# Patient Record
Sex: FEMALE | Race: WHITE | NOT HISPANIC OR LATINO | Employment: FULL TIME | URBAN - METROPOLITAN AREA
[De-identification: names, ages, dates, MRNs, and addresses within clinical notes are randomized per-mention and may not be internally consistent; named-entity substitution may affect disease eponyms.]

---

## 2023-02-21 LAB — THYROTROPIN (MIU/L) IN SER/PLAS BY DETECTION LIMIT <= 0.05 MIU/L: 1.71 MIU/L (ref 0.44–3.98)

## 2023-02-22 LAB
CLUE CELLS: ABNORMAL
NUGENT SCORE: 4
VAGINITIS-BV + YEAST INTERPRETATION: ABNORMAL
YEAST: ABNORMAL

## 2023-02-23 PROBLEM — N92.6 IRREGULAR BLEEDING: Status: ACTIVE | Noted: 2023-02-23

## 2023-02-23 PROBLEM — B37.31 VAGINAL CANDIDIASIS: Status: ACTIVE | Noted: 2023-02-23

## 2023-02-23 PROBLEM — F41.8 DEPRESSION WITH ANXIETY: Status: ACTIVE | Noted: 2023-02-23

## 2023-02-23 PROBLEM — N89.8 VAGINAL DISCHARGE: Status: ACTIVE | Noted: 2023-02-23

## 2023-02-23 PROBLEM — J30.2 ALLERGIC RHINITIS, SEASONAL: Status: ACTIVE | Noted: 2023-02-23

## 2023-02-23 PROBLEM — N92.1 MENORRHAGIA WITH IRREGULAR CYCLE: Status: ACTIVE | Noted: 2023-02-23

## 2023-02-23 PROBLEM — E03.9 HYPOTHYROIDISM: Status: ACTIVE | Noted: 2023-02-23

## 2023-02-23 PROBLEM — Z97.5 IUD CONTRACEPTION: Status: ACTIVE | Noted: 2023-02-23

## 2023-02-23 PROBLEM — F41.1 ANXIETY STATE: Status: ACTIVE | Noted: 2023-02-23

## 2023-02-23 PROBLEM — E66.9 CLASS 2 OBESITY WITH BODY MASS INDEX (BMI) OF 35.0 TO 35.9 IN ADULT: Status: ACTIVE | Noted: 2023-02-23

## 2023-02-23 PROBLEM — E55.9 VITAMIN D DEFICIENCY: Status: ACTIVE | Noted: 2023-02-23

## 2023-02-23 PROBLEM — E66.812 CLASS 2 OBESITY WITH BODY MASS INDEX (BMI) OF 35.0 TO 35.9 IN ADULT: Status: ACTIVE | Noted: 2023-02-23

## 2023-02-23 PROBLEM — R45.89 DEPRESSED MOOD: Status: ACTIVE | Noted: 2023-02-23

## 2023-02-23 RX ORDER — SERTRALINE HYDROCHLORIDE 50 MG/1
50 TABLET, FILM COATED ORAL DAILY
COMMUNITY
End: 2023-03-22 | Stop reason: SDUPTHER

## 2023-02-23 RX ORDER — LEVONORGESTREL 52 MG/1
INTRAUTERINE DEVICE INTRAUTERINE
COMMUNITY

## 2023-02-23 RX ORDER — METRONIDAZOLE 500 MG/1
500 TABLET ORAL 2 TIMES DAILY
COMMUNITY
End: 2023-03-22 | Stop reason: ALTCHOICE

## 2023-02-23 RX ORDER — FLUTICASONE PROPIONATE 50 MCG
2 SPRAY, SUSPENSION (ML) NASAL DAILY
COMMUNITY
Start: 2019-10-17 | End: 2023-03-22 | Stop reason: SDUPTHER

## 2023-03-22 ENCOUNTER — OFFICE VISIT (OUTPATIENT)
Dept: PRIMARY CARE | Facility: CLINIC | Age: 38
End: 2023-03-22
Payer: COMMERCIAL

## 2023-03-22 VITALS
WEIGHT: 207.4 LBS | SYSTOLIC BLOOD PRESSURE: 124 MMHG | HEIGHT: 62 IN | HEART RATE: 93 BPM | BODY MASS INDEX: 38.16 KG/M2 | DIASTOLIC BLOOD PRESSURE: 84 MMHG | RESPIRATION RATE: 18 BRPM | OXYGEN SATURATION: 96 %

## 2023-03-22 DIAGNOSIS — K21.9 GASTROESOPHAGEAL REFLUX DISEASE WITHOUT ESOPHAGITIS: ICD-10-CM

## 2023-03-22 DIAGNOSIS — Z13.29 SCREENING FOR HYPOTHYROIDISM: ICD-10-CM

## 2023-03-22 DIAGNOSIS — F17.210 CIGARETTE SMOKER: ICD-10-CM

## 2023-03-22 DIAGNOSIS — E55.9 VITAMIN D DEFICIENCY: ICD-10-CM

## 2023-03-22 DIAGNOSIS — E78.2 MIXED HYPERLIPIDEMIA: ICD-10-CM

## 2023-03-22 DIAGNOSIS — R73.01 ELEVATED FASTING BLOOD SUGAR: ICD-10-CM

## 2023-03-22 DIAGNOSIS — B96.89 BV (BACTERIAL VAGINOSIS): ICD-10-CM

## 2023-03-22 DIAGNOSIS — N76.0 BV (BACTERIAL VAGINOSIS): ICD-10-CM

## 2023-03-22 DIAGNOSIS — F41.8 DEPRESSION WITH ANXIETY: Primary | ICD-10-CM

## 2023-03-22 DIAGNOSIS — J30.1 SEASONAL ALLERGIC RHINITIS DUE TO POLLEN: ICD-10-CM

## 2023-03-22 DIAGNOSIS — E53.8 VITAMIN B12 DEFICIENCY: ICD-10-CM

## 2023-03-22 DIAGNOSIS — Z09 FOLLOW-UP EXAM: ICD-10-CM

## 2023-03-22 PROBLEM — E66.9 CLASS 2 OBESITY WITH BODY MASS INDEX (BMI) OF 35.0 TO 35.9 IN ADULT: Status: RESOLVED | Noted: 2023-02-23 | Resolved: 2023-03-22

## 2023-03-22 PROBLEM — E66.812 CLASS 2 OBESITY WITH BODY MASS INDEX (BMI) OF 35.0 TO 35.9 IN ADULT: Status: RESOLVED | Noted: 2023-02-23 | Resolved: 2023-03-22

## 2023-03-22 PROBLEM — N92.1 MENORRHAGIA WITH IRREGULAR CYCLE: Status: RESOLVED | Noted: 2023-02-23 | Resolved: 2023-03-22

## 2023-03-22 PROBLEM — E03.9 HYPOTHYROIDISM: Status: RESOLVED | Noted: 2023-02-23 | Resolved: 2023-03-22

## 2023-03-22 PROBLEM — B37.31 VAGINAL CANDIDIASIS: Status: RESOLVED | Noted: 2023-02-23 | Resolved: 2023-03-22

## 2023-03-22 PROBLEM — F41.1 ANXIETY STATE: Status: RESOLVED | Noted: 2023-02-23 | Resolved: 2023-03-22

## 2023-03-22 PROBLEM — R45.89 DEPRESSED MOOD: Status: RESOLVED | Noted: 2023-02-23 | Resolved: 2023-03-22

## 2023-03-22 PROCEDURE — 3008F BODY MASS INDEX DOCD: CPT

## 2023-03-22 PROCEDURE — 99204 OFFICE O/P NEW MOD 45 MIN: CPT

## 2023-03-22 PROCEDURE — 99406 BEHAV CHNG SMOKING 3-10 MIN: CPT

## 2023-03-22 PROCEDURE — 4004F PT TOBACCO SCREEN RCVD TLK: CPT

## 2023-03-22 RX ORDER — FLUTICASONE PROPIONATE 50 MCG
2 SPRAY, SUSPENSION (ML) NASAL DAILY
Qty: 16 G | Refills: 11 | Status: SHIPPED | OUTPATIENT
Start: 2023-03-22

## 2023-03-22 RX ORDER — PRASTERONE (DHEA) 5 MG
CAPSULE ORAL
Qty: 30 CAPSULE | Refills: 11 | Status: SHIPPED | OUTPATIENT
Start: 2023-03-22 | End: 2023-04-25 | Stop reason: SDUPTHER

## 2023-03-22 RX ORDER — OMEPRAZOLE 20 MG/1
40 TABLET, DELAYED RELEASE ORAL
Qty: 60 TABLET | Refills: 11 | COMMUNITY
Start: 2023-03-22 | End: 2023-04-25 | Stop reason: ALTCHOICE

## 2023-03-22 RX ORDER — SERTRALINE HYDROCHLORIDE 50 MG/1
50 TABLET, FILM COATED ORAL DAILY
Qty: 90 TABLET | Refills: 3 | Status: SHIPPED | OUTPATIENT
Start: 2023-03-22 | End: 2023-04-25

## 2023-03-22 ASSESSMENT — ENCOUNTER SYMPTOMS
DEPRESSION: 0
NEUROLOGICAL NEGATIVE: 1
HEMATOLOGIC/LYMPHATIC NEGATIVE: 1
ALLERGIC/IMMUNOLOGIC NEGATIVE: 1
CONSTITUTIONAL NEGATIVE: 1
MUSCULOSKELETAL NEGATIVE: 1
GASTROINTESTINAL NEGATIVE: 1
RESPIRATORY NEGATIVE: 1
EYES NEGATIVE: 1
PSYCHIATRIC NEGATIVE: 1
LOSS OF SENSATION IN FEET: 0
ENDOCRINE NEGATIVE: 1
CARDIOVASCULAR NEGATIVE: 1
OCCASIONAL FEELINGS OF UNSTEADINESS: 0

## 2023-03-22 ASSESSMENT — PATIENT HEALTH QUESTIONNAIRE - PHQ9
2. FEELING DOWN, DEPRESSED OR HOPELESS: NOT AT ALL
SUM OF ALL RESPONSES TO PHQ9 QUESTIONS 1 AND 2: 0
1. LITTLE INTEREST OR PLEASURE IN DOING THINGS: NOT AT ALL

## 2023-03-22 NOTE — ASSESSMENT & PLAN NOTE
I recommend a low carb diet and moderate to high intensity exercise for at least a total of 150 minutes per week for obesity with BMI of 37.93. Normal adult BMI that you should be targeting  is 20 to 25. Fifteen minutes was spent counselling.  We will discussed weight loss interventions at your 1 month appointment.  Labs must be completed.

## 2023-03-22 NOTE — ASSESSMENT & PLAN NOTE
Patient reports having worsening acid reflux and symptoms of GERD ever since starting sertraline 50 mg oral tablet daily.  Please begin taking omeprazole 20 mg oral tablet twice a day as needed for symptom management.

## 2023-03-22 NOTE — PROGRESS NOTES
"Subjective   Patient ID: Vita Conrad is a 38 y.o. female who presents for New Patient Visit.    HPI a 38-year-old female arrives to the clinic with chief complaint of new patient establishment.  Patient is here to establish with a new provider as her previous one is no longer in network.  Patient does have a past medical history of recurrent bacterial vaginosis and anxiety depression.  Her OB/GYN started her on sertraline 50 mg oral tablet daily for anxiety and depression.  Patient reports success with this medication and control of her emotions.  She is wonder if she can continue this medication.  She also has a few questions regards to her sertraline that she would like to discuss.  Other than this, patient denies any chest pain, shortness of breath, diarrhea, fevers, chills, head pain, COVID-like symptoms.    Review of Systems   Constitutional: Negative.    HENT: Negative.     Eyes: Negative.    Respiratory: Negative.     Cardiovascular: Negative.    Gastrointestinal: Negative.    Endocrine: Negative.    Genitourinary: Negative.    Musculoskeletal: Negative.    Skin: Negative.    Allergic/Immunologic: Negative.    Neurological: Negative.    Hematological: Negative.    Psychiatric/Behavioral: Negative.     All other systems reviewed and are negative.      Objective   /84 (BP Location: Left arm, Patient Position: Sitting, BP Cuff Size: Large adult)   Pulse 93   Resp 18   Ht 1.575 m (5' 2\")   Wt 94.1 kg (207 lb 6.4 oz)   SpO2 96%   BMI 37.93 kg/m²     Physical Exam  Vitals and nursing note reviewed.   Constitutional:       Appearance: Normal appearance.   HENT:      Head: Normocephalic and atraumatic.      Right Ear: Tympanic membrane normal.      Left Ear: Tympanic membrane normal.      Nose: Nose normal.      Mouth/Throat:      Mouth: Mucous membranes are moist.      Pharynx: Oropharynx is clear.   Eyes:      Extraocular Movements: Extraocular movements intact.      Conjunctiva/sclera: Conjunctivae " normal.      Pupils: Pupils are equal, round, and reactive to light.   Cardiovascular:      Rate and Rhythm: Normal rate and regular rhythm.   Pulmonary:      Effort: Pulmonary effort is normal.      Breath sounds: Normal breath sounds.   Abdominal:      General: Bowel sounds are normal.      Palpations: Abdomen is soft.   Musculoskeletal:         General: Normal range of motion.      Cervical back: Normal range of motion and neck supple.   Skin:     General: Skin is warm.      Capillary Refill: Capillary refill takes less than 2 seconds.   Neurological:      General: No focal deficit present.      Mental Status: She is alert and oriented to person, place, and time. Mental status is at baseline.   Psychiatric:         Mood and Affect: Mood normal.         Behavior: Behavior normal.         Thought Content: Thought content normal.         Judgment: Judgment normal.         Assessment/Plan   Problem List Items Addressed This Visit          Digestive    Gastroesophageal reflux disease without esophagitis     Patient reports having worsening acid reflux and symptoms of GERD ever since starting sertraline 50 mg oral tablet daily.  Please begin taking omeprazole 20 mg oral tablet twice a day as needed for symptom management.         Relevant Medications    omeprazole OTC (PriLOSEC OTC) 20 mg EC tablet       Genitourinary    BV (bacterial vaginosis)     I have prescribed you maximum pearls over-the-counter 5 mg to take once a day for BV prevention         Relevant Medications    L.acidoph,plant-B.animal,long (Probiotic Pearls Max Potency) 5 billion cell capsule,delayed release(DR/EC)       Endocrine/Metabolic    Vitamin D deficiency     Vitamin D levels ordered.         Relevant Orders    Vitamin D, Total    Vitamin B12 deficiency     Vitamin B12 levels ordered.         Relevant Orders    CBC    Vitamin B12    BMI 37.0-37.9, adult     I recommend a low carb diet and moderate to high intensity exercise for at least a total  of 150 minutes per week for obesity with BMI of 37.93. Normal adult BMI that you should be targeting  is 20 to 25. Fifteen minutes was spent counselling.  We will discussed weight loss interventions at your 1 month appointment.  Labs must be completed.            Other    Allergic rhinitis, seasonal     Flonase intranasal ordered.         Relevant Medications    fluticasone (Flonase) 50 mcg/actuation nasal spray    Depression with anxiety - Primary     Patient was started on sertraline 50 mg oral tablet daily by her OB/GYN 2 weeks ago.  Patient reports success with this medication would like to continue it.  Patient denies any suicidal homicidal ideations or worsening anxiety and depression.  Continue sertraline 50 mg oral tablet daily.         Relevant Medications    sertraline (Zoloft) 50 mg tablet    Elevated fasting blood sugar     Hemoglobin A1c ordered.         Relevant Orders    Hemoglobin A1C    Mixed hyperlipidemia     Lipid panel ordered.         Relevant Orders    Comprehensive Metabolic Panel    Lipid Panel    Screening for hypothyroidism     TSH levels ordered.         Relevant Orders    TSH with reflex to Free T4 if abnormal    Cigarette smoker     Cigarette smoking cessation counselling for 4 minutes. Patient will focus on stopping  to help decrease risk of developing cancers, respiratory, and cardiovascular diseases.           Other Visit Diagnoses       Follow-up exam        Relevant Orders    Follow Up In [facility] - PCP          It was a pleasure seeing in the office today.    Please complete the lab work that was ordered prior to your next appointment.  These labs require you to fast.  Your next appointment will be in 1 month.    Anticipatory guidance, age appropriate vaccines, screening exams, health promotion and prevention discussed.    This document was generated using the assistance of voice recognition software. If there are any errors of spelling, grammar, syntax, or meaning;  please feel free to contact me directly for clarification.

## 2023-03-22 NOTE — ASSESSMENT & PLAN NOTE
Cigarette smoking cessation counselling for 4 minutes. Patient will focus on stopping  to help decrease risk of developing cancers, respiratory, and cardiovascular diseases.    no photophobia/no diplopia/no blurred vision L/no blurred vision R

## 2023-03-22 NOTE — ASSESSMENT & PLAN NOTE
Patient was started on sertraline 50 mg oral tablet daily by her OB/GYN 2 weeks ago.  Patient reports success with this medication would like to continue it.  Patient denies any suicidal homicidal ideations or worsening anxiety and depression.  Continue sertraline 50 mg oral tablet daily.

## 2023-03-22 NOTE — PROGRESS NOTES
New patient to establish.   Prescribed zoloft by obgyn for anxiety and depression- she would like you to take over and continue this med.

## 2023-04-18 ENCOUNTER — LAB (OUTPATIENT)
Dept: LAB | Facility: LAB | Age: 38
End: 2023-04-18
Payer: COMMERCIAL

## 2023-04-18 DIAGNOSIS — R73.01 ELEVATED FASTING BLOOD SUGAR: ICD-10-CM

## 2023-04-18 DIAGNOSIS — E55.9 VITAMIN D DEFICIENCY: ICD-10-CM

## 2023-04-18 DIAGNOSIS — E53.8 VITAMIN B12 DEFICIENCY: ICD-10-CM

## 2023-04-18 DIAGNOSIS — E78.2 MIXED HYPERLIPIDEMIA: ICD-10-CM

## 2023-04-18 DIAGNOSIS — Z13.29 SCREENING FOR HYPOTHYROIDISM: ICD-10-CM

## 2023-04-18 LAB
ALANINE AMINOTRANSFERASE (SGPT) (U/L) IN SER/PLAS: 32 U/L (ref 7–45)
ALBUMIN (G/DL) IN SER/PLAS: 4.2 G/DL (ref 3.4–5)
ALKALINE PHOSPHATASE (U/L) IN SER/PLAS: 71 U/L (ref 33–110)
ANION GAP IN SER/PLAS: 12 MMOL/L (ref 10–20)
ASPARTATE AMINOTRANSFERASE (SGOT) (U/L) IN SER/PLAS: 22 U/L (ref 9–39)
BILIRUBIN TOTAL (MG/DL) IN SER/PLAS: 0.4 MG/DL (ref 0–1.2)
CALCIDIOL (25 OH VITAMIN D3) (NG/ML) IN SER/PLAS: 28 NG/ML
CALCIUM (MG/DL) IN SER/PLAS: 9.2 MG/DL (ref 8.6–10.3)
CARBON DIOXIDE, TOTAL (MMOL/L) IN SER/PLAS: 25 MMOL/L (ref 21–32)
CHLORIDE (MMOL/L) IN SER/PLAS: 107 MMOL/L (ref 98–107)
CHOLESTEROL (MG/DL) IN SER/PLAS: 161 MG/DL (ref 0–199)
CHOLESTEROL IN HDL (MG/DL) IN SER/PLAS: 48.6 MG/DL
CHOLESTEROL/HDL RATIO: 3.3
COBALAMIN (VITAMIN B12) (PG/ML) IN SER/PLAS: 975 PG/ML (ref 211–911)
CREATININE (MG/DL) IN SER/PLAS: 0.91 MG/DL (ref 0.5–1.05)
ERYTHROCYTE DISTRIBUTION WIDTH (RATIO) BY AUTOMATED COUNT: 13.1 % (ref 11.5–14.5)
ERYTHROCYTE MEAN CORPUSCULAR HEMOGLOBIN CONCENTRATION (G/DL) BY AUTOMATED: 32.8 G/DL (ref 32–36)
ERYTHROCYTE MEAN CORPUSCULAR VOLUME (FL) BY AUTOMATED COUNT: 92 FL (ref 80–100)
ERYTHROCYTES (10*6/UL) IN BLOOD BY AUTOMATED COUNT: 4.28 X10E12/L (ref 4–5.2)
ESTIMATED AVERAGE GLUCOSE FOR HBA1C: 117 MG/DL
GFR FEMALE: 83 ML/MIN/1.73M2
GLUCOSE (MG/DL) IN SER/PLAS: 80 MG/DL (ref 74–99)
HEMATOCRIT (%) IN BLOOD BY AUTOMATED COUNT: 39.3 % (ref 36–46)
HEMOGLOBIN (G/DL) IN BLOOD: 12.9 G/DL (ref 12–16)
HEMOGLOBIN A1C/HEMOGLOBIN TOTAL IN BLOOD: 5.7 %
LDL: 82 MG/DL (ref 0–99)
LEUKOCYTES (10*3/UL) IN BLOOD BY AUTOMATED COUNT: 7.8 X10E9/L (ref 4.4–11.3)
PLATELETS (10*3/UL) IN BLOOD AUTOMATED COUNT: 296 X10E9/L (ref 150–450)
POTASSIUM (MMOL/L) IN SER/PLAS: 4.7 MMOL/L (ref 3.5–5.3)
PROTEIN TOTAL: 6.8 G/DL (ref 6.4–8.2)
SODIUM (MMOL/L) IN SER/PLAS: 139 MMOL/L (ref 136–145)
THYROTROPIN (MIU/L) IN SER/PLAS BY DETECTION LIMIT <= 0.05 MIU/L: 1.36 MIU/L (ref 0.44–3.98)
TRIGLYCERIDE (MG/DL) IN SER/PLAS: 154 MG/DL (ref 0–149)
UREA NITROGEN (MG/DL) IN SER/PLAS: 16 MG/DL (ref 6–23)
VLDL: 31 MG/DL (ref 0–40)

## 2023-04-18 PROCEDURE — 82306 VITAMIN D 25 HYDROXY: CPT

## 2023-04-18 PROCEDURE — 80053 COMPREHEN METABOLIC PANEL: CPT

## 2023-04-18 PROCEDURE — 36415 COLL VENOUS BLD VENIPUNCTURE: CPT

## 2023-04-18 PROCEDURE — 84443 ASSAY THYROID STIM HORMONE: CPT

## 2023-04-18 PROCEDURE — 85027 COMPLETE CBC AUTOMATED: CPT

## 2023-04-18 PROCEDURE — 80061 LIPID PANEL: CPT

## 2023-04-18 PROCEDURE — 82607 VITAMIN B-12: CPT

## 2023-04-18 PROCEDURE — 83036 HEMOGLOBIN GLYCOSYLATED A1C: CPT

## 2023-04-25 ENCOUNTER — OFFICE VISIT (OUTPATIENT)
Dept: PRIMARY CARE | Facility: CLINIC | Age: 38
End: 2023-04-25
Payer: COMMERCIAL

## 2023-04-25 VITALS
HEART RATE: 90 BPM | OXYGEN SATURATION: 95 % | SYSTOLIC BLOOD PRESSURE: 128 MMHG | HEIGHT: 62 IN | RESPIRATION RATE: 18 BRPM | DIASTOLIC BLOOD PRESSURE: 78 MMHG | WEIGHT: 199.4 LBS | BODY MASS INDEX: 36.7 KG/M2

## 2023-04-25 DIAGNOSIS — Z97.5 IUD CONTRACEPTION: ICD-10-CM

## 2023-04-25 DIAGNOSIS — F41.8 DEPRESSION WITH ANXIETY: ICD-10-CM

## 2023-04-25 DIAGNOSIS — Z09 FOLLOW-UP EXAM: Primary | ICD-10-CM

## 2023-04-25 DIAGNOSIS — E78.2 MIXED HYPERLIPIDEMIA: ICD-10-CM

## 2023-04-25 DIAGNOSIS — N76.0 BV (BACTERIAL VAGINOSIS): ICD-10-CM

## 2023-04-25 DIAGNOSIS — H66.90 ACUTE OTITIS MEDIA, UNSPECIFIED OTITIS MEDIA TYPE: ICD-10-CM

## 2023-04-25 DIAGNOSIS — R73.03 PREDIABETES: ICD-10-CM

## 2023-04-25 DIAGNOSIS — B96.89 BV (BACTERIAL VAGINOSIS): ICD-10-CM

## 2023-04-25 PROBLEM — K21.9 GASTROESOPHAGEAL REFLUX DISEASE WITHOUT ESOPHAGITIS: Status: RESOLVED | Noted: 2023-03-22 | Resolved: 2023-04-25

## 2023-04-25 PROBLEM — N92.6 IRREGULAR BLEEDING: Status: RESOLVED | Noted: 2023-02-23 | Resolved: 2023-04-25

## 2023-04-25 PROBLEM — N89.8 VAGINAL DISCHARGE: Status: RESOLVED | Noted: 2023-02-23 | Resolved: 2023-04-25

## 2023-04-25 PROCEDURE — 3008F BODY MASS INDEX DOCD: CPT

## 2023-04-25 PROCEDURE — 4004F PT TOBACCO SCREEN RCVD TLK: CPT

## 2023-04-25 PROCEDURE — 99213 OFFICE O/P EST LOW 20 MIN: CPT

## 2023-04-25 RX ORDER — METFORMIN HYDROCHLORIDE 500 MG/1
500 TABLET ORAL
Qty: 180 TABLET | Refills: 0 | Status: SHIPPED | OUTPATIENT
Start: 2023-04-25 | End: 2023-07-24

## 2023-04-25 RX ORDER — AMOXICILLIN AND CLAVULANATE POTASSIUM 875; 125 MG/1; MG/1
875 TABLET, FILM COATED ORAL 2 TIMES DAILY
Qty: 20 TABLET | Refills: 0 | Status: SHIPPED | OUTPATIENT
Start: 2023-04-25 | End: 2023-05-05

## 2023-04-25 RX ORDER — SERTRALINE HYDROCHLORIDE 25 MG/1
25 TABLET, FILM COATED ORAL DAILY
Qty: 90 TABLET | Refills: 0 | Status: SHIPPED | OUTPATIENT
Start: 2023-04-25 | End: 2023-08-04 | Stop reason: SDUPTHER

## 2023-04-25 RX ORDER — PRASTERONE (DHEA) 5 MG
CAPSULE ORAL
Qty: 30 CAPSULE | Refills: 11 | Status: SHIPPED | OUTPATIENT
Start: 2023-04-25

## 2023-04-25 RX ORDER — SERTRALINE HYDROCHLORIDE 50 MG/1
50 TABLET, FILM COATED ORAL DAILY
Qty: 90 TABLET | Refills: 0 | Status: SHIPPED | OUTPATIENT
Start: 2023-04-25 | End: 2023-08-04 | Stop reason: SDUPTHER

## 2023-04-25 ASSESSMENT — ENCOUNTER SYMPTOMS
GASTROINTESTINAL NEGATIVE: 1
CONSTITUTIONAL NEGATIVE: 1
RESPIRATORY NEGATIVE: 1
MUSCULOSKELETAL NEGATIVE: 1
EYES NEGATIVE: 1
CARDIOVASCULAR NEGATIVE: 1
ENDOCRINE NEGATIVE: 1
ALLERGIC/IMMUNOLOGIC NEGATIVE: 1
HEMATOLOGIC/LYMPHATIC NEGATIVE: 1
NEUROLOGICAL NEGATIVE: 1
PSYCHIATRIC NEGATIVE: 1

## 2023-04-25 NOTE — PROGRESS NOTES
"Subjective   Patient ID: Vita Conrad is a 38 y.o. female who presents for Follow-up.    HPI a 38-year-old female with past medical history of recurrent bacterial vaginosis, currently on birth control management via Mirena inserted by her OB/GYN, prediabetes, anxiety and depression, allergic rhinitis arrives to the clinic with chief complaint of 1 month follow-up.  Patient was given instructions to complete blood work.  She did complete the blood work that was ordered prior to this encounter like to review them today.  She also needs medication refills.  She does report having a decreased libido due to starting the Zoloft and is wondering if there are any medications to help with her libido.  She also is wondering if she can start any weight loss medications since we discussed that at her last appointment.  Other than this, patient denies any chest pain, shortness of breath, diarrhea, fevers, chills, head pain, COVID-like symptoms.    Review of Systems   Constitutional: Negative.    HENT: Negative.     Eyes: Negative.    Respiratory: Negative.     Cardiovascular: Negative.    Gastrointestinal: Negative.    Endocrine: Negative.    Genitourinary: Negative.    Musculoskeletal: Negative.    Skin: Negative.    Allergic/Immunologic: Negative.    Neurological: Negative.    Hematological: Negative.    Psychiatric/Behavioral: Negative.     All other systems reviewed and are negative.      Objective   /78 (BP Location: Left arm, BP Cuff Size: Large adult)   Pulse 90   Resp 18   Ht 1.575 m (5' 2\")   Wt 90.4 kg (199 lb 6.4 oz)   SpO2 95%   BMI 36.47 kg/m²     Physical Exam  Vitals and nursing note reviewed.   Constitutional:       Appearance: Normal appearance.   HENT:      Head: Normocephalic and atraumatic.      Right Ear: Tympanic membrane normal.      Left Ear: Tympanic membrane normal.      Nose: Nose normal.      Mouth/Throat:      Mouth: Mucous membranes are moist.      Pharynx: Oropharynx is clear.   Eyes: "      Extraocular Movements: Extraocular movements intact.      Conjunctiva/sclera: Conjunctivae normal.      Pupils: Pupils are equal, round, and reactive to light.   Cardiovascular:      Rate and Rhythm: Normal rate and regular rhythm.   Pulmonary:      Effort: Pulmonary effort is normal.      Breath sounds: Normal breath sounds.   Abdominal:      General: Bowel sounds are normal.      Palpations: Abdomen is soft.   Musculoskeletal:         General: Normal range of motion.      Cervical back: Normal range of motion and neck supple.   Skin:     General: Skin is warm.      Capillary Refill: Capillary refill takes less than 2 seconds.   Neurological:      General: No focal deficit present.      Mental Status: She is alert and oriented to person, place, and time. Mental status is at baseline.   Psychiatric:         Mood and Affect: Mood normal.         Behavior: Behavior normal.         Thought Content: Thought content normal.         Judgment: Judgment normal.         Assessment/Plan   Problem List Items Addressed This Visit          Genitourinary    IUD contraception    BV (bacterial vaginosis)    Relevant Medications    L.acidoph,plant-B.animal,long (Probiotic Pearls Max Potency) 5 billion cell capsule,delayed release(DR/EC)       Other    Depression with anxiety    Relevant Medications    sertraline (Zoloft) 50 mg tablet    sertraline (Zoloft) 25 mg tablet    Mixed hyperlipidemia    Relevant Orders    Lipid panel     Other Visit Diagnoses       Follow-up exam    -  Primary    Prediabetes        Relevant Medications    metFORMIN (Glucophage) 500 mg tablet    Other Relevant Orders    Hemoglobin A1C    Acute otitis media, unspecified otitis media type        Relevant Medications    amoxicillin-pot clavulanate (Augmentin) 875-125 mg tablet          It was a pleasure seeing you in the office today.    In regards to your left ear pain, you have a perforated eardrum and redness of the ear canal.  I did send over Augmentin  1 tablet twice a day for 10 days.  Please use this antibiotic regimen as directed.    Your lab work is unremarkable.  You have an elevated triglyceride of 152.  You report that you need your triglycerides within normal limits for insurance purposes.  I have repeat a lipid panel for you to complete in 6 months.    You report having decreased libido due to being on sertraline 75 mg oral capsule daily.  You report that you will continue using this medication for the next 90 days and will determine if you would like to try different SSRI.  There are medications that can help with her libido however this will cause changes in hormonal levels when on replacement therapy for long-term.  You report that you will continue to use sertraline until further notice.    Hemoglobin A1c is 5.7%.  She reports losing 8 pounds since being on sertraline.  You are still interested in losing weight.  Please begin taking metformin 500 mg oral tablet twice a day for your prediabetes as well as for weight loss.  Reevaluate hemoglobin A1c in 6 months.    I have refilled all of your medications as requested.    I have ordered blood work for you to complete prior to your next appointment.  These labs require you to fast.  Your next appoint will be in 6 months.    I also advised you to follow low fat diet and exercise for at least 30 minutes daily.    Anticipatory guidance, age appropriate vaccines, screening exams, health promotion and prevention discussed.    This document was generated using the assistance of voice recognition software. If there are any errors of spelling, grammar, syntax, or meaning; please feel free to contact me directly for clarification.

## 2023-04-25 NOTE — PROGRESS NOTES
1 month follow up     Check ears- states hearing is not great.  Upped dosage the day she saw you- of sertraline - asking for new script for 75 mg 1/ daily.   She is concerned about not having a sex drive - wants to discuss.

## 2023-07-28 LAB
CLUE CELLS: PRESENT
NUGENT SCORE: 5
VAGINITIS-BV + YEAST INTERPRETATION: ABNORMAL
YEAST: ABNORMAL

## 2023-08-04 DIAGNOSIS — F41.8 DEPRESSION WITH ANXIETY: ICD-10-CM

## 2023-08-08 RX ORDER — SERTRALINE HYDROCHLORIDE 50 MG/1
50 TABLET, FILM COATED ORAL DAILY
Qty: 90 TABLET | Refills: 0 | Status: SHIPPED | OUTPATIENT
Start: 2023-08-08 | End: 2023-12-07 | Stop reason: SDUPTHER

## 2023-08-08 RX ORDER — SERTRALINE HYDROCHLORIDE 25 MG/1
25 TABLET, FILM COATED ORAL DAILY
Qty: 90 TABLET | Refills: 0 | Status: SHIPPED | OUTPATIENT
Start: 2023-08-08 | End: 2023-12-07 | Stop reason: SDUPTHER

## 2023-10-05 ENCOUNTER — TELEPHONE (OUTPATIENT)
Dept: OBSTETRICS AND GYNECOLOGY | Facility: CLINIC | Age: 38
End: 2023-10-05
Payer: COMMERCIAL

## 2023-10-05 NOTE — TELEPHONE ENCOUNTER
Patient was referred to see LUIS MIGUEL STOCKTON as a primary care provider, but he is no longer practicing for primary care. She would like a call back with some references that Cira Melendez would recommend as a primary care provider. Please advise.

## 2023-10-25 ENCOUNTER — APPOINTMENT (OUTPATIENT)
Dept: PRIMARY CARE | Facility: CLINIC | Age: 38
End: 2023-10-25
Payer: COMMERCIAL

## 2023-11-14 ENCOUNTER — LAB (OUTPATIENT)
Dept: LAB | Facility: LAB | Age: 38
End: 2023-11-14
Payer: COMMERCIAL

## 2023-11-14 DIAGNOSIS — E78.2 MIXED HYPERLIPIDEMIA: ICD-10-CM

## 2023-11-14 DIAGNOSIS — R73.03 PREDIABETES: ICD-10-CM

## 2023-11-14 LAB
CHOLEST SERPL-MCNC: 176 MG/DL (ref 0–199)
CHOLESTEROL/HDL RATIO: 2.8
EST. AVERAGE GLUCOSE BLD GHB EST-MCNC: 111 MG/DL
HBA1C MFR BLD: 5.5 %
HDLC SERPL-MCNC: 63.8 MG/DL
LDLC SERPL CALC-MCNC: 91 MG/DL
NON HDL CHOLESTEROL: 112 MG/DL (ref 0–149)
TRIGL SERPL-MCNC: 105 MG/DL (ref 0–149)
VLDL: 21 MG/DL (ref 0–40)

## 2023-11-14 PROCEDURE — 36415 COLL VENOUS BLD VENIPUNCTURE: CPT

## 2023-11-14 PROCEDURE — 83036 HEMOGLOBIN GLYCOSYLATED A1C: CPT

## 2023-11-14 PROCEDURE — 80061 LIPID PANEL: CPT

## 2023-11-16 ENCOUNTER — TELEPHONE (OUTPATIENT)
Dept: OBSTETRICS AND GYNECOLOGY | Facility: CLINIC | Age: 38
End: 2023-11-16
Payer: COMMERCIAL

## 2023-12-07 ENCOUNTER — TELEPHONE (OUTPATIENT)
Dept: OBSTETRICS AND GYNECOLOGY | Facility: CLINIC | Age: 38
End: 2023-12-07
Payer: COMMERCIAL

## 2023-12-07 DIAGNOSIS — F41.8 DEPRESSION WITH ANXIETY: ICD-10-CM

## 2023-12-07 RX ORDER — SERTRALINE HYDROCHLORIDE 50 MG/1
50 TABLET, FILM COATED ORAL DAILY
Qty: 90 TABLET | Refills: 3 | Status: SHIPPED | OUTPATIENT
Start: 2023-12-07 | End: 2024-12-06

## 2023-12-07 RX ORDER — SERTRALINE HYDROCHLORIDE 25 MG/1
25 TABLET, FILM COATED ORAL DAILY
Qty: 90 TABLET | Refills: 3 | Status: SHIPPED | OUTPATIENT
Start: 2023-12-07 | End: 2024-12-06

## 2023-12-07 NOTE — TELEPHONE ENCOUNTER
Reviewing  [facility]  Last Office Visit with Cira 07/27/2023  Last Annual Exam: 02/21/2023  Negative Pap / Negative HPV 2020  Local PCP office information provided via phone message on 10/05/2023

## 2023-12-07 NOTE — TELEPHONE ENCOUNTER
Requesting refill on Sertraline 75mg. Was prescribed by PCP, but she not longer has one. Ike Farah

## 2025-01-04 ENCOUNTER — OFFICE VISIT (OUTPATIENT)
Dept: URGENT CARE | Age: 40
End: 2025-01-04
Payer: COMMERCIAL

## 2025-01-04 VITALS
DIASTOLIC BLOOD PRESSURE: 73 MMHG | HEART RATE: 88 BPM | TEMPERATURE: 97.7 F | RESPIRATION RATE: 16 BRPM | SYSTOLIC BLOOD PRESSURE: 113 MMHG | OXYGEN SATURATION: 96 %

## 2025-01-04 DIAGNOSIS — H66.92 LEFT OTITIS MEDIA, UNSPECIFIED OTITIS MEDIA TYPE: Primary | ICD-10-CM

## 2025-01-04 RX ORDER — AMOXICILLIN AND CLAVULANATE POTASSIUM 875; 125 MG/1; MG/1
1 TABLET, FILM COATED ORAL 2 TIMES DAILY
Qty: 20 TABLET | Refills: 0 | Status: SHIPPED | OUTPATIENT
Start: 2025-01-04 | End: 2025-01-14

## 2025-01-04 RX ORDER — FLUCONAZOLE 150 MG/1
150 TABLET ORAL ONCE
Qty: 1 TABLET | Refills: 0 | Status: SHIPPED | OUTPATIENT
Start: 2025-01-04 | End: 2025-01-04

## 2025-01-04 NOTE — PROGRESS NOTES
Subjective   Patient ID: Vita Conrad is a 39 y.o. female. They present today with a chief complaint of Earache (left).    History of Present Illness  HPI an otherwise healthy 39-year-old female arrives to the clinic with chief complaint of ear pain.  The patient is concerned for an ear infection as she reports pain and tenderness over the last 24 hours.  She does have a history of ear infections.    Past Medical History  Allergies as of 01/04/2025    (No Known Allergies)       (Not in a hospital admission)       Past Medical History:   Diagnosis Date    Personal history of other mental and behavioral disorders 12/21/2021    History of depression       Past Surgical History:   Procedure Laterality Date    OTHER SURGICAL HISTORY  11/06/2019    Orthopedic surgery        reports that she has been smoking cigarettes. She has a 10 pack-year smoking history. She has never used smokeless tobacco. She reports that she does not use drugs.    Review of Systems  Review of Systems  Ear pain    Objective    Vitals:    01/04/25 0845   BP: 113/73   Pulse: 88   Resp: 16   Temp: 36.5 °C (97.7 °F)   SpO2: 96%     No LMP recorded.    Physical Exam  Erythematous tympanic membrane to the left ear with a reddened inner ear canal  Procedures    Point of Care Test & Imaging Results from this visit  No results found for this visit on 01/04/25.   No results found.    Diagnostic study results (if any) were reviewed by ROBYN Forrest.    Assessment/Plan   Allergies, medications, history, and pertinent labs/EKGs/Imaging reviewed by ROBYN Forrest.     Medical Decision Making  Upon initial assessment, the patient was sitting calmly in bedside chair in no acute distress.  Left ear examination does reveal otitis media as evidenced by an erythematous TM and a red and inner ear canal.  Given her history, Augmentin 1 tablet twice a day for 10 days has been sent in addition to Diflucan.  We did not have amoxicillin in-house  today so the substitute with medial Augmentin and the patient is agreeable to that.    As a result of the work-up, the patient was discharged home.  she was informed of her diagnosis and instructed to come back with any concerns or worsening of condition.  she and was agreeable to the plan as discussed above.  she was given the opportunity to ask questions.  All of the patient's questions were answered.    This document was generated using the assistance of voice recognition software. If there are any errors of spelling, grammar, syntax, or meaning; please feel free to contact me directly for clarification.      Orders and Diagnoses  Diagnoses and all orders for this visit:  Left otitis media, unspecified otitis media type  -     amoxicillin-pot clavulanate (Augmentin) 875-125 mg tablet; Take 1 tablet by mouth 2 times a day for 10 days.  -     fluconazole (Diflucan) 150 mg tablet; Take 1 tablet (150 mg) by mouth 1 time for 1 dose.      Medical Admin Record      Patient disposition: Home    Electronically signed by ROBYN Forrest  8:57 AM

## 2025-01-04 NOTE — LETTER
January 4, 2025     Patient: Vita Conrad   YOB: 1985   Date of Visit: 1/4/2025       To Whom It May Concern:    Vita Conrad was seen in my clinic on 1/4/2025 at 8:45 am. Please excuse Vita for her absence from work on this day to make the appointment.    If you have any questions or concerns, please don't hesitate to call.         Sincerely,         Christopher New, APRN-CNP        CC: No Recipients